# Patient Record
Sex: MALE | Race: WHITE | URBAN - METROPOLITAN AREA
[De-identification: names, ages, dates, MRNs, and addresses within clinical notes are randomized per-mention and may not be internally consistent; named-entity substitution may affect disease eponyms.]

---
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
## 2021-01-04 LAB
REASON FOR REJECTION: NORMAL
REJECTED TEST: NORMAL

## 2021-01-08 ENCOUNTER — TELEPHONE (OUTPATIENT)
Dept: INTERNAL MEDICINE CLINIC | Age: 21
End: 2021-01-08

## 2021-01-08 NOTE — TELEPHONE ENCOUNTER
Call returned to patient. Patient notified of lab accident. Patient will recollect and provide another sample.

## 2021-01-15 ENCOUNTER — TELEPHONE (OUTPATIENT)
Dept: INTERNAL MEDICINE CLINIC | Age: 21
End: 2021-01-15

## 2021-01-15 LAB — CALPROTECTIN, FECAL: 57 UG/G

## 2021-01-15 NOTE — TELEPHONE ENCOUNTER
Pt called questions on     12/29/20 and 1/4/21 are the same message same sample   2nd sample is in process its a send out went to [place] for processing,spoke with pt he understood and acknowledged

## 2021-01-16 LAB — INTERPRETATION: NEGATIVE
